# Patient Record
Sex: MALE | URBAN - METROPOLITAN AREA
[De-identification: names, ages, dates, MRNs, and addresses within clinical notes are randomized per-mention and may not be internally consistent; named-entity substitution may affect disease eponyms.]

---

## 2019-09-20 ENCOUNTER — TELEPHONE (OUTPATIENT)
Dept: DERMATOLOGY | Facility: CLINIC | Age: 54
End: 2019-09-20

## 2019-09-20 ENCOUNTER — DOCUMENTATION ONLY (OUTPATIENT)
Dept: CARE COORDINATION | Facility: CLINIC | Age: 54
End: 2019-09-20

## 2019-09-20 NOTE — TELEPHONE ENCOUNTER
FUTURE VISIT INFORMATION      FUTURE VISIT INFORMATION:    Date: 9.25.19    Time: 2:15    Location:  DermSurg  REFERRAL INFORMATION:    Referring provider:  Dr. Lisa Villanueva    Referring providers clinic:  [facility]    Reason for visit/diagnosis:  BCCx1, nasal tip    RECORDS REQUESTED FROM:       Clinic name Comments Records Status Photos Status   [facility] 9.16.19 Dr. Villanueva   Path #[id number] Received-sent to scanning Received- sent to scanning

## 2019-09-25 ENCOUNTER — PRE VISIT (OUTPATIENT)
Dept: DERMATOLOGY | Facility: CLINIC | Age: 54
End: 2019-09-25

## 2019-09-25 ENCOUNTER — OFFICE VISIT (OUTPATIENT)
Dept: DERMATOLOGY | Facility: CLINIC | Age: 54
End: 2019-09-25
Payer: COMMERCIAL

## 2019-09-25 DIAGNOSIS — C44.311 BASAL CELL CARCINOMA (BCC) OF NASAL TIP: Primary | ICD-10-CM

## 2019-09-25 ASSESSMENT — PAIN SCALES - GENERAL: PAINLEVEL: NO PAIN (0)

## 2019-09-25 NOTE — NURSING NOTE
Chief Complaint   Patient presents with     Derm Problem     Patient is here today for a consult for mohs of basal cell carcinoma on nasal tip.     Elo Lynch CMA

## 2019-09-25 NOTE — PATIENT INSTRUCTIONS
Mohs Cutaneous Micrographic Surgery Unit  Dr. Jett Escalante      Pre-Surgical Instruction Sheet    1. Expect to be here majority of the morning.  The Mohs surgical procedure can be an extensive surgery requiring multiple stages. Each stage may take 1-2 hours.    ? You may eat breakfast  ? You may bring snacks or beverages with you  ? Take all normal medications morning of surgery -- unless otherwise indicated by your physician  ? If you have an artificial heart valve, or joint replacement within two years, we will prescribe an antibiotic. Please take one hour prior to surgery.    2. You will need a  to be with you if your surgical site is close to your eyes. You can get swelling/bruising immediately after surgery and will go home with a big bulky bandage that could obstruct your view of driving safely.    3. Wear comfortable clothing -- preferably a button down shirt or a loose fitted shirt. (to avoid pulling over pressure bandage off when you get home) If your surgery site is on your leg, try wearing loose fitted pants. If your surgery site is on your arm, try wearing a short-sleeve shirt.    4. Bring reading material or other items to help pass time. We do have internet access available if you own a laptop, iPad, etc.)    5. Women - do NOT wear make-up. We will be washing it off anyone needing surgery on the face    6. Do NOT stop blood thinning medication unless instructed to do so by your surgeon. This will include: Warfarin, Coumadin, Jantoven, Aspirin, Plavix and Pradaxa. If you are taking Warfarin or Coumadin, please have your INR blood test results sent to our office no more than 7 days prior to your surgery.     7. Tylenol is a good alternative to take for headaches and pain, and can be taken throughout your surgery and postoperative recovery.    8. If your surgery is on your trunk, arms, hands, legs, feet, fingernail or a toenail, please wash the area with Hibiclens, an over-the-counter antiseptic soap,  the night before and morning of your surgery.     If you have any questions, please feel free to contact us.    Phone numbers:  During business hours (M-F 8:00-4:30 p.m.)  [facility]:  [phone] - select option 1 for appt. desk  [phone] - select option 3 for nurse triage line  [facility]:   [phone] - goes straight to call center   ---------------------------------------------------------  Evenings/Weekends/Holidays  Hospital - [phone]   TTY for hearing krqnjvoe-[phone]  *Ask  to page dermatologist on-call  Emergency Cpzy-[phone]  TTY for hearing impaired- [phone]

## 2019-09-25 NOTE — PROGRESS NOTES
[facility] D[facility]atology Note    Dermatology Surgery Clinic  [facility]  Clinics and Surgery Center  [address] [place] [id number]    Dermatology Problem List:  1.BCC of the nasal tip, s/p [facility] TBD    Encounter Date: Sep 25, 2019    CC:  Chief Complaint   Patient presents with     Derm Problem     Patient is here today for a consult for mohs of basal cell carcinoma on nasal tip.       History of Present Illness:  Mr. Johnnie Rivera is a 54 year old male who presents today for a Mohs consultation regarding a BCC of the nasal tip. The pt was referred by Dr. Lauren Villanueva from [facility]. This is his first time in the clinic. He states that he first noticed the BCC about 4-5 weeks ago. He thought it was a scab at first, however it continuously peeled and bled. No previous Hx of skin cancers. The patient is otherwise feeling well. There are no other skin concerns at this time.      Past Medical History:   There is no problem list on file for this patient.    Past Medical History:   Diagnosis Date     Other chest pain     No Comments Provided     Screening for other and unspecified cardiovascular conditions     2/11/2010     Spinal stenosis, other than cervical     10/10/2012     Past Surgical History:   Procedure Laterality Date     DENTAL SURGERY      Implants of two front teeth     VASECTOMY      No Comments Provided       Social History:  Social History     Socioeconomic History     Marital status:      Spouse name: Not on file     Number of children: Not on file     Years of education: Not on file     Highest education level: Not on file   Occupational History     Not on file   Social Needs     Financial resource strain: Not on file     Food insecurity:     Worry: Not on file     Inability: Not on file     Transportation needs:     Medical: Not on file     Non-medical: Not on file   Tobacco Use     Smoking status: Never Smoker     Smokeless  tobacco: Never Used   Substance and Sexual Activity     Alcohol use: No     Drug use: Unknown     Types: Other     Comment: Drug use: No     Sexual activity: Yes     Partners: Female   Lifestyle     Physical activity:     Days per week: Not on file     Minutes per session: Not on file     Stress: Not on file   Relationships     Social connections:     Talks on phone: Not on file     Gets together: Not on file     Attends Pent[employer]ostalism service: Not on file     Active member of club or organization: Not on file     Attends meetings of clubs or organizations: Not on file     Relationship status: Not on file     Intimate partner violence:     Fear of current or ex partner: Not on file     Emotionally abused: Not on file     Physically abused: Not on file     Forced sexual activity: Not on file   Other Topics Concern     Not on file   Social History Narrative    Preload  5/20/2013  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     Family History:  Family History   Problem Relation Age of Onset     Family History Negative Mother         Good Health     Family History Negative Father         Good Health     Family History Negative Brother         Good Health     Family History Negative Sister         Good Health        Medications:  No current outpatient medications on file.     Allergies no known allergies    Review of Systems:  As per HPI.  -Skin Establ Pt: The patient denies any new rash, pruritus, or lesions that are symptomatic, changing or bleeding, except as per HPI.  -Constitutional: The patient is feeling generally well.    Physical exam:  Vitals: There were no vitals taken for this visit.  GEN: This is a well developed, well-nourished male in no acute distress, in a pleasant mood.    SKIN: Focused examination of the nose was performed.  - 4.0 x 4.0 mm eroded pink papule on the nasal tip  - No other lesions of concern on areas examined.       Impression/Plan:  1. BCC of the nasal tip. Mohs surgery  recommended.    Anticipate probably flap reconstruction.    Reviewed pathology report and nature of diagnosis.     Risks, benefits, and process of Mohs micrographic surgery were discussed including possibility of damage to surrounding anatomical structures and infection. Patient is comfortable proceeding with the surgery; consent form was obtained. He will be scheduled at his convenience.    No indication for pre-op antibiotics.    Pre-op written instructions provided.     Follow-up as scheduled for MMS.       Staff Involved:    Scribe Disclosure  I, Antionette Simon, am serving as a scribe to document services personally performed by Dr. Jett Escalante, based on data collection and the provider's statements to me.     Attending Attestation  I attest that the Scribe recorded the interview and exam that I personally performed.  I have reviewed the note and edited it as necessary.    Jett Escalante M.D.  Professor  Director of Dermatologic Surgery  Department of Dermatology  [facility]

## 2019-09-25 NOTE — LETTER
9/25/2019       RE: Johnnie Rivera  [address] [place] [address]     Dear Colleague,    Thank you for referring your patient, Johnnie Rivera, to the [facility] DERMATOLOGIC SURGERY at [facility]. Please see a copy of my visit note below.    [facility] Dermatology Note    Dermatology Surgery Clinic  [facility]  Clinics and Surgery Center  [address] [place] [address]    Dermatology Problem List:  1.BCC of the nasal tip, s/p [facility] TBD    Encounter Date: Sep 25, 2019    CC:  Chief Complaint   Patient presents with     Derm Problem     Patient is here today for a consult for mohs of basal cell carcinoma on nasal tip.       History of Present Illness:  Mr. Johnnie Rivera is a 54 year old male who presents today for a Mohs consultation regarding a BCC of the nasal tip. The pt was referred by Dr. Lauren Villanueva from [facility]. This is his first time in the clinic. He states that he first noticed the BCC about 4-5 weeks ago. He thought it was a scab at first, however it continuously peeled and bled. No previous Hx of skin cancers. The patient is otherwise feeling well. There are no other skin concerns at this time.      Past Medical History:   There is no problem list on file for this patient.    Past Medical History:   Diagnosis Date     Other chest pain     No Comments Provided     Screening for other and unspecified cardiovascular conditions     2/11/2010     Spinal stenosis, other than cervical     10/10/2012     Past Surgical History:   Procedure Laterality Date     DENTAL SURGERY      Implants of two front teeth     VASECTOMY      No Comments Provided       Social History:  Social History     Socioeconomic History     Marital status:      Spouse name: Not on file     Number of children: Not on file     Years of education: Not on file     Highest education level: Not on file   Occupational History     Not  on file   Social Needs     Financial resource strain: Not on file     Food insecurity:     Worry: Not on file     Inability: Not on file     Transportation needs:     Medical: Not on file     Non-medical: Not on file   Tobacco Use     Smoking status: Never Smoker     Smokeless tobacco: Never Used   Substance and Sexual Activity     Alcohol use: No     Drug use: Unknown     Types: Other     Comment: Drug use: No     Sexual activity: Yes     Partners: Female   Lifestyle     Physical activity:     Days per week: Not on file     Minutes per session: Not on file     Stress: Not on file   Relationships     Social connections:     Talks on phone: Not on file     Gets together: Not on file     Attends Latter day service: Not on file     Active member of club or organization: Not on file     Attends meetings of clubs or organizations: Not on file     Relationship status: Not on file     Intimate partner violence:     Fear of current or ex partner: Not on file     Emotionally abused: Not on file     Physically abused: Not on file     Forced sexual activity: Not on file   Other Topics Concern     Not on file   Social History Narrative    Preload  5/20/2013  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     Family History:  Family History   Problem Relation Age of Onset     Family History Negative Mother         Good Health     Family History Negative Father         Good Health     Family History Negative Brother         Good Health     Family History Negative Sister         Good Health        Medications:  No current outpatient medications on file.     Allergies no known allergies    Review of Systems:  As per HPI.  -Skin Establ Pt: The patient denies any new rash, pruritus, or lesions that are symptomatic, changing or bleeding, except as per HPI.  -Constitutional: The patient is feeling generally well.    Physical exam:  Vitals: There were no vitals taken for this visit.  GEN: This is a well developed, well-nourished  male in no acute distress, in a pleasant mood.    SKIN: Focused examination of the nose was performed.  - 4.0 x 4.0 mm eroded pink papule on the nasal tip  - No other lesions of concern on areas examined.       Impression/Plan:  1. BCC of the nasal tip. Mohs surgery recommended.    Anticipate probably flap reconstruction.    Reviewed pathology report and nature of diagnosis.     Risks, benefits, and process of Mohs micrographic surgery were discussed including possibility of damage to surrounding anatomical structures and infection. Patient is comfortable proceeding with the surgery; consent form was obtained. He will be scheduled at his convenience.    No indication for pre-op antibiotics.    Pre-op written instructions provided.     Follow-up as scheduled for MMS.       Staff Involved:    Scribe Disclosure  I, Antionette Simon, am serving as a scribe to document services personally performed by Dr. Jett Escalante, based on data collection and the provider's statements to me.     Attending Attestation  I attest that the Scribe recorded the interview and exam that I personally performed.  I have reviewed the note and edited it as necessary.    Jett Escalante M.D.  Professor  Director of Dermatologic Surgery  Department of Dermatology  [facility]            Again, thank you for allowing me to participate in the care of your patient.      Sincerely,    Jett Escalante MD

## 2019-10-01 ENCOUNTER — OFFICE VISIT (OUTPATIENT)
Dept: DERMATOLOGY | Facility: CLINIC | Age: 54
End: 2019-10-01
Payer: COMMERCIAL

## 2019-10-01 VITALS — HEART RATE: 81 BPM | SYSTOLIC BLOOD PRESSURE: 138 MMHG | DIASTOLIC BLOOD PRESSURE: 83 MMHG

## 2019-10-01 DIAGNOSIS — C44.311 BASAL CELL CARCINOMA (BCC) OF NASAL TIP: Primary | ICD-10-CM

## 2019-10-01 RX ORDER — ACETAMINOPHEN 500 MG
1000 TABLET ORAL ONCE
Status: COMPLETED | OUTPATIENT
Start: 2019-10-01 | End: 2019-10-01

## 2019-10-01 RX ADMIN — Medication 1000 MG: at 14:53

## 2019-10-01 ASSESSMENT — PAIN SCALES - GENERAL: PAINLEVEL: NO PAIN (0)

## 2019-10-01 NOTE — PATIENT INSTRUCTIONS
Standard Sutures and Flaps:    I will experience scar, bleeding, swelling, pain, crusting and redness. I may experience incomplete removal or recurrence. Risks are bleeding, bruising, infection, nerve damage, & large wound. A second procedure may be recommended to obtain the best cosmetic or functional result.    Caring for your skin after surgery  After your surgery, a pressure bandage will be placed over the area that has stitches. This will prevent bleeding. Please follow these instructions over the next 1 to 2 weeks. Following this regimen will help to prevent complications as your wound heals.     For the first 48 hours after your surgery:    Leave the pressure dressing on and keep it dry. If it should come loose, you may re-tape it, but do not take it off.    Relax and take it easy. Do not do any vigorous exercise or heavy lifting. This could cause the wound to bleed.    Post-Operative pain is usually mild. You may take plain or extra-strength Tylenol (acetaminophen) every 4 hours as needed. Do not take any medication that contains aspirin.    You may put an ice pack around the bandaged area for 20 minutes at a time as needed. This may help reduce swelling, bruising, and pain. Make sure the ice pack is waterproof so that the pressure bandage doesn t get wet.    You may see a small amount of drainage or blood on your pressure bandage. This is normal. However:  o If drainage or bleeding continues or saturates the bandage, you will need to apply firm pressure over the bandage with a piece of gauze for 15 minutes.  o If bleeding continues after applying pressure for 15 minutes, apply an ice pack to the bandaged area for 15 minutes.  o If bleeding still continues, call our office or go to the nearest emergency room.  Remove pressure dressing 48 hours after surgery on tip of nose :      Carefully remove the pressure bandage. If it seems sticky or too difficult to get off, you may need to soak it off in the  shower.    After the pressure dressing is removed, you may shower and get the wound wet. However, Do Not let the forceful stream of the shower hit the wound directly.    Follow these wound care and dressing change instructions:  o Do Not wash the suture site. You may allow water to run over the site. Take a cotton swab wet with warm soapy water and gently roll along the suture site to help remove any crust or drainage.   o Do Not rub or scrub the site    o After site is clean pat dry and apply a thin layer of vaseline ointment  over the suture site with a cotton swab.  o Cover the suture site with Telfa (non-stick) dressing. You may tape a piece of gauze over the Telfa for extra protection if you wish.  o Continue the wound care and dressing changes every day until you come back to have your stitches taken out.    What to expect:    The first couple of days your wound may be tender and may bleed slightly when doing wound care.    There may be swelling and bruising around the wound, especially if it is near the eyes. For your comfort, you may apply ice or cold compresses to the bruises after your have removed the pressure bandage.    The area around your wound may be numb for several weeks or even months.    You may experience periodic sharp pain or mild itching around the wound as it heals.     The suture line will look dark pink at first and the edges of the wound will be reddened. This will lighten up each day.  When to call us:    You have bleeding that will not stop after applying pressure and ice.    You have pain that is not controlled with Tylenol (acetaminophen.)    You have signs or symptoms of an infection such as:  o Fever over 100 degrees Fahrenheit  o Redness, warmth or foul-smelling drainage from the wound  o If you have any questions, or are not sure how to take care of the wound.    Phone numbers:  During business hours (M-F 8:00-4:30 p.m.)  [facility]-  [phone] Option  1 appt. School Admissionsk  [phone]  Option 3 nurse triage line  ---------------------------------------------------------  Evenings/Weekends/Holidays  Hospital - [phone]   TTY for hearing zazjtwrb-[phone]  *Ask  to page dermatologist on-call  Emergency Qtrw-[phone]  TTY for hearing impaired- [phone]

## 2019-10-01 NOTE — LETTER
10/1/2019       RE: Johnnie Rivera  [address] [facility] [facility] [address]     Dear Colleague,    Thank you for referring your patient, Johnnie Rivera, to the [facility] DERMATOLOGIC SURGERY at [facility]. Please see a copy of my visit note below.    [facility] Mohs Dermatologic Surgery Procedure Note    Dermatology Surgery Clinic  [facility]  Clinics and Surgery Center  [address] [place] [address]    Date of Service:  Oct 1, 2019  Surgery: Mohs micrographic surgery    Surgeon: Jett Escalante MD    Case 1  Repair Type: local flap (trilobed)  Repair Size: 3 x 3.4 cm  Suture Material: Monocryl 5-0; Fast Absorbing Gut 5-0  Tumor Type: BCC - Basal cell carcinoma  Location: Nasal tip  Derm-Path Accession #: N/A at the time of this note  PreOp Size: 0.4 x 0.4 cm  PostOp Size: 1.2 x 1.2 cm  Mohs Accession #:  [id number]  Level of Defect: fascia      Procedure:  We discussed the principles of treatment and most likely complications including scarring, bleeding, infection, swelling, pain, crusting, nerve damage, large wound,  incomplete excision, wound dehiscence,  nerve damage, recurrence, and a second procedure may be recommended to obtain the best cosmetic or functional result.    Informed consent was obtained and the patient underwent the procedure as follows:  The patient was placed supine on the operating table.  The cancer was identified, outlined with a marker, and verified by the patient.  The entire surgical field was prepped with Hibiclens.  The surgical site was anesthetized using Lidocaine 1% with epi 1:100,000.    The area of clinically apparent tumor was not debulked. The layer of tissue was then surgically excised using a #15 blade and was then transferred onto a specimen sheet maintaining the orientation of the specimen. Hemostasis was obtained using electrocoagulation. The wound site was then covered with a dressing  while the tissue samples were processed for examination.    The excised tissue was transported to the Mohs histology laboratory maintaining the tissue orientation.  The tissue specimen was relaxed so that the entire surgical margin was in a a single horizontal plane for sectioning and inked for precise mapping.  A precise reference map was drawn to reflect the sectioning of the specimen, colored inking of the margins, and orientation on the patient. The tissue was processed using horizontal sectioning of the base and continuous peripheral margins.  The histopathologic sections were reviewed in conjunction with the reference map.    Total blocks: 1    Total slides:  2    There were no cancer cells visualized on examination, therefore Mohs surgery was complete.      Reconstruction: Trilobed Flap    INDICATIONS:  The patient is status post Mohs micrographic surgery.  After consideration of the adjacent tissue type and reconstructive options, including healing by second intention, it was determined that a trilobed transposition flap offered the best chance for preservation of normal anatomic and functional relationships.  The patient was advised of the risks of bleeding, infection, wound dehiscence, pincushioning and discomfort, as well as scar formation.  Informed consent was obtained in writing.  The patient underwent the procedure as follows:    PROCEDURES:  The patient was taken to the operative suite and placed supine on the operating room table.  The wound was identified and infiltrated with Lidocaine 1% with epi 1:100,000.  The defect was then cleansed and prepped with Hibiclens and draped with sterile drapes.  Using a marker, a trilobed transposition flap repair was planned.  The wound edges were then debeveled and the wound was undermined bluntly in all directions. The trilobed transposition flap was incised sharply to submuscular plane.  The flap was undermined from all surrounding tissue. Hemostasis was  obtained with electrocoagulation.  The first lobe was transposed into the primary defect.  The second lobe was transposed into the defect created by the transposition of the first lobe and the third lobe was transposed into the defect created by the transposition of the second lobe.  The defect and flap were secured using 5-0 Monocryl buried vertical mattress sutures.  The epidermis was then carefully approximated using simple running 5-0 fast absorbing gut sutures throughout the length of the flap.  Redundant skin was excised by the triangulation technique, and closed in similar fashion.  The wound was cleansed with saline and ointment was applied.  A sterile pressure dressing was placed.  The patient left the operating suite in stable condition.   Wound care was reviewed verbally and in writing.      The attending surgeon was present for the entire procedure and always imediately available.      Staff Involved:    Scribe Disclosure  I, Antionette Simon, am serving as a scribe to document services personally performed by Dr. Jett Escalante, based on data collection and the provider's statements to me.         Attending attestation:  I personally performed the entire procedure.  I have reviewed the note and edited it as necessary, and agree with its contents.    Jett Escalante M.D.  Professor  Director of Dermatologic Surgery  Department of Dermatology  [facility]    Dermatology Surgery Clinic  [facility] Surgery [place] [phone]

## 2019-10-01 NOTE — PROGRESS NOTES
[facility] Mohs Dermatologic Surgery Procedure Note    Dermatology Surgery Clinic  [facility]  Clinics and Surgery Center  [address] [place] [id number]    Date of Service:  Oct 1, 2019  Surgery: Mohs micrographic surgery    Surgeon: Jett Escalante MD    Case 1  Repair Type: local flap (trilobed)  Repair Size: 3 x 3.4 cm  Suture Material: Monocryl 5-0; Fast Absorbing Gut 5-0  Tumor Type: BCC - Basal cell carcinoma  Location: Nasal tip  Derm-Path Accession #: N/[id number] at the time of this note  PreOp Size: 0.4 x 0.4 cm  PostOp Size: 1.2 x 1.2 cm  Mohs Accession #:  [id number]  Level of Defect: fascia      Procedure:  We discussed the principles of treatment and most likely complications including scarring, bleeding, infection, swelling, pain, crusting, nerve damage, large wound,  incomplete excision, wound dehiscence,  nerve damage, recurrence, and a second procedure may be recommended to obtain the best cosmetic or functional result.    Informed consent was obtained and the patient underwent the procedure as follows:  The patient was placed supine on the operating table.  The cancer was identified, outlined with a marker, and verified by the patient.  The entire surgical field was prepped with Hibiclens.  The surgical site was anesthetized using Lidocaine 1% with epi 1:100,000.    The area of clinically apparent tumor was not debulked. The layer of tissue was then surgically excised using a #15 blade and was then transferred onto a specimen sheet maintaining the orientation of the specimen. Hemostasis was obtained using electrocoagulation. The wound site was then covered with a dressing while the tissue samples were processed for examination.    The excised tissue was transported to the Mohs histology laboratory maintaining the tissue orientation.  The tissue specimen was relaxed so that the entire surgical margin was in a a single horizontal plane for sectioning and inked  for precise mapping.  A precise reference map was drawn to reflect the sectioning of the specimen, colored inking of the margins, and orientation on the patient. The tissue was processed using horizontal sectioning of the base and continuous peripheral margins.  The histopathologic sections were reviewed in conjunction with the reference map.    Total blocks: 1    Total slides:  2    There were no cancer cells visualized on examination, therefore Mohs surgery was complete.      Reconstruction: Trilobed Flap    INDICATIONS:  The patient is status post Mohs micrographic surgery.  After consideration of the adjacent tissue type and reconstructive options, including healing by second intention, it was determined that a trilobed transposition flap offered the best chance for preservation of normal anatomic and functional relationships.  The patient was advised of the risks of bleeding, infection, wound dehiscence, pincushioning and discomfort, as well as scar formation.  Informed consent was obtained in writing.  The patient underwent the procedure as follows:    PROCEDURES:  The patient was taken to the operative suite and placed supine on the operating room table.  The wound was identified and infiltrated with Lidocaine 1% with epi 1:100,000.  The defect was then cleansed and prepped with Hibiclens and draped with sterile drapes.  Using a marker, a trilobed transposition flap repair was planned.  The wound edges were then debeveled and the wound was undermined bluntly in all directions. The trilobed transposition flap was incised sharply to submuscular plane.  The flap was undermined from all surrounding tissue. Hemostasis was obtained with electrocoagulation.  The first lobe was transposed into the primary defect.  The second lobe was transposed into the defect created by the transposition of the first lobe and the third lobe was transposed into the defect created by the transposition of the second lobe.  The defect  and flap were secured using 5-0 Monocryl buried vertical mattress sutures.  The epidermis was then carefully approximated using simple running 5-0 fast absorbing gut sutures throughout the length of the flap.  Redundant skin was excised by the triangulation technique, and closed in similar fashion.  The wound was cleansed with saline and ointment was applied.  A sterile pressure dressing was placed.  The patient left the operating suite in stable condition.   Wound care was reviewed verbally and in writing.      The attending surgeon was present for the entire procedure and always imediately available.      Staff Involved:    Scribe Disclosure  I, Antionette Simon, am serving as a scribe to document services personally performed by Dr. Jett Escalante, based on data collection and the provider's statements to me.         Attending attestation:  I personally performed the entire procedure.  I have reviewed the note and edited it as necessary, and agree with its contents.    Jett Escalante M.D.  Professor  Director of Dermatologic Surgery  Department of Dermatology  [facility]    Dermatology Surgery Clinic  [facility]  [address] [place], [place] [id number]

## 2019-10-02 ENCOUNTER — TELEPHONE (OUTPATIENT)
Dept: DERMATOLOGY | Facility: CLINIC | Age: 54
End: 2019-10-02

## 2019-10-02 NOTE — TELEPHONE ENCOUNTER
Follow up call completed following Mohs procedure with Dr. Escalante.    The following questions were asked:    What are you doing to manage your pain? Tylenol and ice  Is it helping? yes  Are you applying ice?  yes  Have you had any noticeable bleeding through the bandage? no   Do you have any concerns? no         Please call [phone] option [phone] if you have any additional questions.

## 2019-12-19 ENCOUNTER — OFFICE VISIT (OUTPATIENT)
Dept: DERMATOLOGY | Facility: CLINIC | Age: 54
End: 2019-12-19
Payer: COMMERCIAL

## 2019-12-19 DIAGNOSIS — C44.311 BASAL CELL CARCINOMA (BCC) OF NASAL TIP: Primary | ICD-10-CM

## 2019-12-19 ASSESSMENT — PAIN SCALES - GENERAL: PAINLEVEL: NO PAIN (0)

## 2019-12-19 NOTE — LETTER
Date:December 26, 2019      Patient was self referred, no letter generated. Do not send.        [facility] Health Information

## 2019-12-19 NOTE — NURSING NOTE
"Chief Complaint   Patient presents with     Derm Problem     Johnnie is here today for 3 month scar evaluation, patient \"can't even tell its there\"     Marianne Birmingham, AYAN    "

## 2019-12-19 NOTE — LETTER
"12/19/2019       RE: Johnnie Rivera  [address] [place] [address]     Dear Colleague,    Thank you for referring your patient, Johnnie Rivera, to the [facility] DERMATOLOGIC SURGERY at [facility]. Please see a copy of my visit note below.    [facility] Dermatology Note    Dermatology Surgery Clinic  [facility]  [address] [place] [id number]    Dermatology Problem List:  1.BCC of the nasal tip, s/p MMS 10/1/19    Encounter Date: Dec 19, 2019    CC:  Chief Complaint   Patient presents with     Derm Problem     Johnnie is here today for 3 month scar evaluation, patient \"can't even tell its there\"       History of Present Illness:  Mr. Johnnie Rivera is a 54 year old male who presents today for a followup from the previous surgery. Details are below.    Original Procedure Date:  October 1, 2019  Reason for visit:  MMS for BCC   Bleeding that required medical attention:  None  Infection:  None  Hospitalization for procedure within 30 days:  None  Are you having any functional problems since the surgery:  NA     Case(s) Specific Information:  Mohs Case 1:  Procedure Location:  Nasal tip  Type of Repair:  Local flap (trilobed)  Is patient happy with appearance of scar: NA  On 1-10 scale, with 10 being how the area looked before the surgery and 1 being the worst imaginable scar, how do you think it looks today?: NA    The patient was last seen in clinic on 10/1/19 for MMS with local flap (trilobed) repair on a BCC of the nasal tip. He comes in today for a post-op followup. Today he reports that there is some hypertrophy and residual dissolving suture at the site. He feels it is quite sensitive to the cold. Otherwise, he feels it is imperceptible in appearance. The patient is otherwise feeling well overall. There are no other skin concerns at this time.      Past Medical History:   There is no " problem list on file for this patient.    Past Medical History:   Diagnosis Date     Other chest pain     No Comments Provided     Screening for other and unspecified cardiovascular conditions     2/11/2010     Spinal stenosis, other than cervical     10/10/2012     Past Surgical History:   Procedure Laterality Date     DENTAL SURGERY      Implants of two front teeth     VASECTOMY      No Comments Provided       Social History:  Social History     Socioeconomic History     Marital status:      Spouse name: Not on file     Number of children: Not on file     Years of education: Not on file     Highest education level: Not on file   Occupational History     Not on file   Social Needs     Financial resource strain: Not on file     Food insecurity:     Worry: Not on file     Inability: Not on file     Transportation needs:     Medical: Not on file     Non-medical: Not on file   Tobacco Use     Smoking status: Never Smoker     Smokeless tobacco: Never Used   Substance and Sexual Activity     Alcohol use: No     Drug use: Unknown     Types: Other     Comment: Drug use: No     Sexual activity: Yes     Partners: Female   Lifestyle     Physical activity:     Days per week: Not on file     Minutes per session: Not on file     Stress: Not on file   Relationships     Social connections:     Talks on phone: Not on file     Gets together: Not on file     Attends Restorationism service: Not on file     Active member of club or organization: Not on file     Attends meetings of clubs or organizations: Not on file     Relationship status: Not on file     Intimate partner violence:     Fear of current or ex partner: Not on file     Emotionally abused: Not on file     Physically abused: Not on file     Forced sexual activity: Not on file   Other Topics Concern     Not on file   Social History Narrative    Preload  5/20/2013       Family History:  Family History   Problem Relation Age of Onset     Family History Negative Mother          Good Health     Family History Negative Father         Good Health     Family History Negative Brother         Good Health     Family History Negative Sister         Good Health        Medications:  No current outpatient medications on file.       No Known Allergies    Review of Systems:  As per HPI.  -Skin Establ Pt: The patient denies any new rash, pruritus, or lesions that are symptomatic, changing or bleeding, except as per HPI.  -Constitutional: The patient is feeling generally well.    Physical exam:  Vitals: There were no vitals taken for this visit.  GEN: This is a well developed, well-nourished male in no acute distress, in a pleasant mood.    SKIN: Focused examination of the face/head, neck, and nose were performed.  - There was a very well-healed trilobed flap on the nose.  - No other lesions of concern on areas examined.       Impression/Plan:    1. BCC of the nasal tip, s/p MMS 10/1/19  Upon review of the surgical site the patient had a very well-healed trilobed flap of the nose. Imperceptible from a normal conversational distance and symmetrical in appearance. There was a small amount of increased background erythema. We will monitor the site for the next 3 months and consider PDL.     Will monitor the site for the next 3 months and consider PDL.    Recommended sunscreens SPF #30 or greater and protective clothing. Risk of scar dyspigmentation discussed.     Continue periodic self skin exams and report of any new or changing lesions.     Please refer to previous clinic note for details     Counseled the patient to follow up with a regular dermatologist for ongoing skin checks due to Hx of NMSC.    Follow-up in 2 months or so for possible PDL.       Staff Involved:    Scribe Disclosure  I, Antionette Simon, am serving as a scribe to document services personally performed by Dr. Jett Escalante, based on data collection and the provider's statements to me.     Attending Attestation  I attest that the Scribe  recorded the interview and exam that I personally performed.  I have reviewed the note and edited it as necessary.    Jett Escalante M.D.  Professor  Director of Dermatologic Surgery  Department of Dermatology  [facility]        Again, thank you for allowing me to participate in the care of your patient.      Sincerely,    Jett Escalante MD

## 2020-03-13 ENCOUNTER — TELEPHONE (OUTPATIENT)
Dept: DERMATOLOGY | Facility: CLINIC | Age: 55
End: 2020-03-13

## 2020-03-13 NOTE — TELEPHONE ENCOUNTER
" Health Call Center    Phone Message    May a detailed message be left on voicemail: yes     Reason for Call: Other: Pt states he would like to cancel his appt. on 3/16/20 with Ava.  Pt states \"A whole bunch of stuff came up\".  Pt will call back to reschedule     Action Taken: Message routed to:  [facility] ([facility]): derm surg    Travel Screening: Not Applicable                                                                      "

## 2020-09-29 NOTE — PROGRESS NOTES
"[facility] Dermatology Note    Dermatology Surgery Clinic  [facility]  Clinics and Surgery Center  [address] [place] [address]    Dermatology Problem List:  1.BCC of the nasal tip, s/p MMS 10/1/19    Encounter Date: Dec 19, 2019    CC:  Chief Complaint   Patient presents with     Derm Problem     Johnnie is here today for 3 month scar evaluation, patient \"can't even tell its there\"       History of Present Illness:  Mr. Johnnie Rivera is a 54 year old male who presents today for a followup from the previous surgery. Details are below.    Original Procedure Date: October 1, 2019  Reason for visit: MMS for BCC   Bleeding that required medical attention: None  Infection: None  Hospitalization for procedure within 30 days: None  Are you having any functional problems since the surgery: NA     Case(s) Specific Information:  Mohs Case 1:  Procedure Location: Nasal tip  Type of Repair: Local flap (trilobed)  Is patient happy with appearance of scar: NA  On 1-10 scale, with 10 being how the area looked before the surgery and 1 being the worst imaginable scar, how do you think it looks today?: NA    The patient was last seen in clinic on 10/1/19 for MMS with local flap (trilobed) repair on a BCC of the nasal tip. He comes in today for a post-op followup. Today he reports that there is some hypertrophy and residual dissolving suture at the site. He feels it is quite sensitive to the cold. Otherwise, he feels it is imperceptible in appearance. The patient is otherwise feeling well overall. There are no other skin concerns at this time.      Past Medical History:   There is no problem list on file for this patient.    Past Medical History:   Diagnosis Date     Other chest pain     No Comments Provided     Screening for other and unspecified cardiovascular conditions     2/11/2010     Spinal stenosis, other than cervical     10/10/2012     Past Surgical History:   Procedure " I am okay with this and will send the budesonide in for her       Thank you Laterality Date     DENTAL SURGERY      Implants of two front teeth     VASECTOMY      No Comments Provided       Social History:  Social History     Socioeconomic History     Marital status:      Spouse name: Not on file     Number of children: Not on file     Years of education: Not on file     Highest education level: Not on file   Occupational History     Not on file   Social Needs     Financial resource strain: Not on file     Food insecurity:     Worry: Not on file     Inability: Not on file     Transportation needs:     Medical: Not on file     Non-medical: Not on file   Tobacco Use     Smoking status: Never Smoker     Smokeless tobacco: Never Used   Substance and Sexual Activity     Alcohol use: No     Drug use: Unknown     Types: Other     Comment: Drug use: No     Sexual activity: Yes     Partners: Female   Lifestyle     Physical activity:     Days per week: Not on file     Minutes per session: Not on file     Stress: Not on file   Relationships     Social connections:     Talks on phone: Not on file     Gets together: Not on file     Attends Quaker service: Not on file     Active member of club or organization: Not on file     Attends meetings of clubs or organizations: Not on file     Relationship status: Not on file     Intimate partner violence:     Fear of current or ex partner: Not on file     Emotionally abused: Not on file     Physically abused: Not on file     Forced sexual activity: Not on file   Other Topics Concern     Not on file   Social History Narrative    Preload  5/20/2013       Family History:  Family History   Problem Relation Age of Onset     Family History Negative Mother         Good Health     Family History Negative Father         Good Health     Family History Negative Brother         Good Health     Family History Negative Sister         Good Health        Medications:  No current outpatient medications on file.       No Known Allergies    Review of Systems:  As per  HPI.  -Skin Establ Pt: The patient denies any new rash, pruritus, or lesions that are symptomatic, changing or bleeding, except as per HPI.  -Constitutional: The patient is feeling generally well.    Physical exam:  Vitals: There were no vitals taken for this visit.  GEN: This is a well developed, well-nourished male in no acute distress, in a pleasant mood.    SKIN: Focused examination of the face/head, neck, and nose were performed.  - There was a very well-healed trilobed flap on the nose.  - No other lesions of concern on areas examined.       Impression/Plan:    1. BCC of the nasal tip, s/p MMS 10/1/19  Upon review of the surgical site the patient had a very well-healed trilobed flap of the nose. Imperceptible from a normal conversational distance and symmetrical in appearance. There was a small amount of increased background erythema. We will monitor the site for the next 3 months and consider PDL.     Will monitor the site for the next 3 months and consider PDL.    Recommended sunscreens SPF #30 or greater and protective clothing. Risk of scar dyspigmentation discussed.     Continue periodic self skin exams and report of any new or changing lesions.     Please refer to previous clinic note for details     Counseled the patient to follow up with a regular dermatologist for ongoing skin checks due to Hx of NMSC.    Follow-up in 2 months or so for possible PDL.       Staff Involved:    Scribe Disclosure  I, Antionette Simon, am serving as a scribe to document services personally performed by Dr. Jett Escalante, based on data collection and the provider's statements to me.     Attending Attestation  I attest that the Scribe recorded the interview and exam that I personally performed.  I have reviewed the note and edited it as necessary.    Jett Escalante M.D.  Professor  Director of Dermatologic Surgery  Department of Dermatology  [facility]

## (undated) RX ORDER — ACETAMINOPHEN 500 MG
TABLET ORAL
Status: DISPENSED
Start: 2019-10-01